# Patient Record
Sex: FEMALE | Race: OTHER | HISPANIC OR LATINO | URBAN - METROPOLITAN AREA
[De-identification: names, ages, dates, MRNs, and addresses within clinical notes are randomized per-mention and may not be internally consistent; named-entity substitution may affect disease eponyms.]

---

## 2023-10-31 ENCOUNTER — EMERGENCY (EMERGENCY)
Facility: HOSPITAL | Age: 33
LOS: 1 days | Discharge: HOME | End: 2023-10-31
Attending: EMERGENCY MEDICINE
Payer: MEDICAID

## 2023-10-31 VITALS
SYSTOLIC BLOOD PRESSURE: 118 MMHG | RESPIRATION RATE: 18 BRPM | HEART RATE: 78 BPM | OXYGEN SATURATION: 100 % | DIASTOLIC BLOOD PRESSURE: 84 MMHG | TEMPERATURE: 98 F

## 2023-10-31 VITALS
OXYGEN SATURATION: 98 % | HEART RATE: 76 BPM | RESPIRATION RATE: 17 BRPM | DIASTOLIC BLOOD PRESSURE: 69 MMHG | SYSTOLIC BLOOD PRESSURE: 122 MMHG | TEMPERATURE: 98 F

## 2023-10-31 PROCEDURE — 99283 EMERGENCY DEPT VISIT LOW MDM: CPT

## 2023-10-31 PROCEDURE — 99284 EMERGENCY DEPT VISIT MOD MDM: CPT

## 2023-10-31 RX ORDER — IBUPROFEN 200 MG
600 TABLET ORAL ONCE
Refills: 0 | Status: COMPLETED | OUTPATIENT
Start: 2023-10-31 | End: 2023-10-31

## 2023-10-31 RX ORDER — ACETAMINOPHEN 500 MG
975 TABLET ORAL ONCE
Refills: 0 | Status: COMPLETED | OUTPATIENT
Start: 2023-10-31 | End: 2023-10-31

## 2023-10-31 RX ADMIN — Medication 975 MILLIGRAM(S): at 01:43

## 2023-10-31 RX ADMIN — Medication 600 MILLIGRAM(S): at 01:43
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
## 2023-10-31 NOTE — ED PROVIDER NOTE - PATIENT PORTAL LINK FT
You can access the FollowMyHealth Patient Portal offered by [facility] by registering at the following website: [email or web address]. By joining [employer]’s FollowMyHealth portal, you will also be able to view your health information using other applications (apps) compatible with our system.
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
## 2023-10-31 NOTE — ED PROVIDER NOTE - PROGRESS NOTE DETAILS
patient no acute distress she has CS 15 abdomen soft nontender nondistended pain control return to ED for intractable abdominal pain fevers new onset motor or sensory deficit patient agrees to plan of care do not suspect intraperitoneal injury clinically

## 2023-10-31 NOTE — ED PROVIDER NOTE - NSFOLLOWUPINSTRUCTIONS_ED_ALL_ED_FT
Paciente: TG MORATAYACANTE  Profesional que asiste al paciente: Brian Welch  Dolor abdominal en los adultos  Abdominal Pain, Adult    El dolor en el abdomen (dolor abdominal) puede tener muchas causas. A menudo, el dolor abdominal no es grave y mejora sin tratamiento o con tratamiento en la casa. Sin embargo, a veces el dolor abdominal es grave.    El médico le hará preguntas sobre hernan antecedentes médicos y le hará un examen físico para tratar de determinar la causa del dolor abdominal.    Siga estas instrucciones en biggs casa:         Medicamentos    Use los medicamentos de venta toño y los recetados solamente sam se lo haya indicado el médico.  No tome un laxante a menos que se lo haya indicado el médico.        Instrucciones generales    Controle biggs afección para detectar cualquier cambio.  Criselda suficiente líquido sam para mantener la orina de color amarillo pálido.  Concurra a todas las visitas de seguimiento sam se lo haya indicado el médico. [place] es importante.    Comuníquese con un médico si:  El dolor abdominal cambia o empeora.  No tiene apetito o baja de peso sin proponérselo.  Está estreñido o tiene diarrea noemi más de 2 o 3 alfredo.  Tiene dolor cuando orina o defeca.  El dolor abdominal lo despierta de noche.  El dolor empeora con las comidas, después de comer o con determinados alimentos.  Tiene vómitos y no puede retener nada de lo que ingiere.  Tiene fiebre.  Observa madhu en la orina.    Solicite ayuda inmediatamente si:  El dolor no desaparece tan pronto sam el médico le dijo que era esperable.  No puede dejar de vomitar.  El dolor se siente solo en zonas del abdomen, sam el lado derecho o la parte inferior izquierda del abdomen. Si se localiza en la bulmaro derecha, posiblemente podría tratarse de apendicitis.  Las heces son sanguinolentas o de color sofia, o de aspecto alquitranado.  Tiene dolor intenso, cólicos o distensión abdominal.  Tiene signos de deshidratación, sam los siguientes:    Orina de color oscuro, muy escasa o falta de orina.  Labios agrietados.  Sequedad de boca.  Ojos hundidos.  Somnolencia.  Debilidad.  Tiene dificultad para respirar o dolor en el pecho.    Resumen  A menudo, el dolor abdominal no es grave y mejora sin tratamiento o con tratamiento en la casa. Sin embargo, a veces el dolor abdominal es grave.  Controle biggs afección para detectar cualquier cambio.  Use los medicamentos de venta toño y los recetados solamente sam se lo haya indicado el médico.  Comuníquese con un médico si el dolor abdominal cambia o empeora.  Busque ayuda de inmediato si tiene dolor intenso, cólicos o distensión abdominal.    NOTAS ADICIONALES E INSTRUCCIONES    Please follow up with your Primary MD in 24-48 hr.  Seek immediate medical care for any new/worsening signs or symptoms.     Document Released: 9/27/2006 Document Revised: 2/5/2021 Document Reviewed: 4/27/2020  [employer] Interactive Patient Education ©2019 [employer] This information is not intended to replace advice given to you by your health care provider. Make sure you discuss any questions you have with your health care provider.
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
## 2023-10-31 NOTE — ED ADULT NURSE NOTE - NSFALLUNIVINTERV_ED_ALL_ED
Bed/Stretcher in lowest position, wheels locked, appropriate side rails in place/Call bell, personal items and telephone in reach/Instruct patient to call for assistance before getting out of bed/chair/stretcher/Non-slip footwear applied when patient is off stretcher/[place] to call system/Physically safe environment - no spills, clutter or unnecessary equipment/Purposeful proactive rounding/Room/bathroom lighting operational, light cord in reach

## 2023-10-31 NOTE — ED PROVIDER NOTE - CONSTITUTIONAL, MLM
normal... Well appearing, awake, alert, oriented to person, place, time/situation and in no apparent distress.  GCS 15

## 2024-04-15 NOTE — ED PROVIDER NOTE - CROS ED CONS ALL NEG
[Well Nourished] : well nourished [No Acute Distress] : no acute distress [Frail] : frail [Normal Conjunctiva] : normal conjunctiva [Normal Venous Pressure] : normal venous pressure [No Carotid Bruit] : no carotid bruit [Normal S1, S2] : normal S1, S2 [No Murmur] : no murmur [No Rub] : no rub [No Gallop] : no gallop [Clear Lung Fields] : clear lung fields [Good Air Entry] : good air entry [No Respiratory Distress] : no respiratory distress  [Soft] : abdomen soft [Non Tender] : non-tender [No Masses/organomegaly] : no masses/organomegaly [Normal Bowel Sounds] : normal bowel sounds [Normal Gait] : normal gait [No Edema] : no edema [No Cyanosis] : no cyanosis [No Clubbing] : no clubbing [No Varicosities] : no varicosities [No Rash] : no rash [No Skin Lesions] : no skin lesions [Moves all extremities] : moves all extremities [No Focal Deficits] : no focal deficits [Normal Speech] : normal speech [Alert and Oriented] : alert and oriented [Normal memory] : normal memory [de-identified] : Sitting in wheelchair negative...

## 2024-06-26 ENCOUNTER — APPOINTMENT (OUTPATIENT)
Dept: PULMONOLOGY | Facility: CLINIC | Age: 34
End: 2024-06-26
Payer: COMMERCIAL

## 2024-06-26 ENCOUNTER — NON-APPOINTMENT (OUTPATIENT)
Age: 34
End: 2024-06-26

## 2024-06-26 VITALS
OXYGEN SATURATION: 98 % | BODY MASS INDEX: 32.25 KG/M2 | HEIGHT: 59 IN | TEMPERATURE: 97.3 F | DIASTOLIC BLOOD PRESSURE: 79 MMHG | HEART RATE: 63 BPM | WEIGHT: 160 LBS | SYSTOLIC BLOOD PRESSURE: 115 MMHG

## 2024-06-26 DIAGNOSIS — G47.33 OBSTRUCTIVE SLEEP APNEA (ADULT) (PEDIATRIC): ICD-10-CM

## 2024-06-26 PROBLEM — Z00.00 ENCOUNTER FOR PREVENTIVE HEALTH EXAMINATION: Status: ACTIVE | Noted: 2024-06-26

## 2024-06-26 PROCEDURE — 94010 BREATHING CAPACITY TEST: CPT

## 2024-06-26 PROCEDURE — 99204 OFFICE O/P NEW MOD 45 MIN: CPT | Mod: 25

## 2024-08-02 ENCOUNTER — OUTPATIENT (OUTPATIENT)
Dept: OUTPATIENT SERVICES | Facility: HOSPITAL | Age: 34
LOS: 1 days | End: 2024-08-02

## 2024-08-02 DIAGNOSIS — G47.33 OBSTRUCTIVE SLEEP APNEA (ADULT) (PEDIATRIC): ICD-10-CM
